# Patient Record
Sex: MALE | Race: WHITE | NOT HISPANIC OR LATINO | URBAN - METROPOLITAN AREA
[De-identification: names, ages, dates, MRNs, and addresses within clinical notes are randomized per-mention and may not be internally consistent; named-entity substitution may affect disease eponyms.]

---

## 2017-04-18 ENCOUNTER — IMPORTED ENCOUNTER (OUTPATIENT)
Dept: URBAN - METROPOLITAN AREA CLINIC 50 | Facility: CLINIC | Age: 72
End: 2017-04-18

## 2017-07-27 ENCOUNTER — IMPORTED ENCOUNTER (OUTPATIENT)
Dept: URBAN - METROPOLITAN AREA CLINIC 50 | Facility: CLINIC | Age: 72
End: 2017-07-27

## 2017-10-06 ENCOUNTER — IMPORTED ENCOUNTER (OUTPATIENT)
Dept: URBAN - METROPOLITAN AREA CLINIC 50 | Facility: CLINIC | Age: 72
End: 2017-10-06

## 2018-02-02 ENCOUNTER — IMPORTED ENCOUNTER (OUTPATIENT)
Dept: URBAN - METROPOLITAN AREA CLINIC 50 | Facility: CLINIC | Age: 73
End: 2018-02-02

## 2018-03-28 ENCOUNTER — APPOINTMENT (RX ONLY)
Dept: URBAN - METROPOLITAN AREA CLINIC 56 | Facility: CLINIC | Age: 73
Setting detail: DERMATOLOGY
End: 2018-03-28

## 2018-03-28 DIAGNOSIS — I78.8 OTHER DISEASES OF CAPILLARIES: ICD-10-CM

## 2018-03-28 DIAGNOSIS — L73.8 OTHER SPECIFIED FOLLICULAR DISORDERS: ICD-10-CM

## 2018-03-28 DIAGNOSIS — L82.0 INFLAMED SEBORRHEIC KERATOSIS: ICD-10-CM

## 2018-03-28 DIAGNOSIS — D69.2 OTHER NONTHROMBOCYTOPENIC PURPURA: ICD-10-CM

## 2018-03-28 DIAGNOSIS — L72.0 EPIDERMAL CYST: ICD-10-CM

## 2018-03-28 PROBLEM — R23.3 SPONTANEOUS ECCHYMOSES: Status: ACTIVE | Noted: 2018-03-28

## 2018-03-28 PROBLEM — H91.90 UNSPECIFIED HEARING LOSS, UNSPECIFIED EAR: Status: ACTIVE | Noted: 2018-03-28

## 2018-03-28 PROBLEM — I48.91 UNSPECIFIED ATRIAL FIBRILLATION: Status: ACTIVE | Noted: 2018-03-28

## 2018-03-28 PROBLEM — M12.9 ARTHROPATHY, UNSPECIFIED: Status: ACTIVE | Noted: 2018-03-28

## 2018-03-28 PROBLEM — J30.1 ALLERGIC RHINITIS DUE TO POLLEN: Status: ACTIVE | Noted: 2018-03-28

## 2018-03-28 PROBLEM — L85.3 XEROSIS CUTIS: Status: ACTIVE | Noted: 2018-03-28

## 2018-03-28 PROBLEM — E78.5 HYPERLIPIDEMIA, UNSPECIFIED: Status: ACTIVE | Noted: 2018-03-28

## 2018-03-28 PROCEDURE — ? LIQUID NITROGEN

## 2018-03-28 PROCEDURE — 99213 OFFICE O/P EST LOW 20 MIN: CPT | Mod: 25

## 2018-03-28 PROCEDURE — 17110 DESTRUCTION B9 LES UP TO 14: CPT

## 2018-03-28 PROCEDURE — ? COUNSELING

## 2018-03-28 ASSESSMENT — LOCATION DETAILED DESCRIPTION DERM
LOCATION DETAILED: LEFT LATERAL SUPERIOR CHEST
LOCATION DETAILED: NASAL DORSUM
LOCATION DETAILED: RIGHT MEDIAL MALAR CHEEK
LOCATION DETAILED: LEFT DISTAL RADIAL DORSAL FOREARM
LOCATION DETAILED: LEFT CENTRAL MALAR CHEEK
LOCATION DETAILED: RIGHT PROXIMAL DORSAL FOREARM
LOCATION DETAILED: LEFT MEDIAL MALAR CHEEK

## 2018-03-28 ASSESSMENT — LOCATION SIMPLE DESCRIPTION DERM
LOCATION SIMPLE: NOSE
LOCATION SIMPLE: LEFT CHEEK
LOCATION SIMPLE: RIGHT FOREARM
LOCATION SIMPLE: LEFT FOREARM
LOCATION SIMPLE: CHEST
LOCATION SIMPLE: RIGHT CHEEK

## 2018-03-28 ASSESSMENT — LOCATION ZONE DERM
LOCATION ZONE: NOSE
LOCATION ZONE: ARM
LOCATION ZONE: TRUNK
LOCATION ZONE: FACE

## 2018-03-28 NOTE — PROCEDURE: LIQUID NITROGEN
Detail Level: Detailed
Post-Care Instructions: I reviewed with the patient in detail post-care instructions. Patient is to wear sunprotection, and avoid picking at any of the treated lesions. Pt may apply Vaseline to crusted or scabbing areas.
Medical Necessity Clause: This procedure was medically necessary because the lesions that were treated were:
Include Z78.9 (Other Specified Conditions Influencing Health Status) As An Associated Diagnosis?: Yes
Render Post-Care Instructions In Note?: no
Medical Necessity Information: It is in your best interest to select a reason for this procedure from the list below. All of these items fulfill various CMS LCD requirements except the new and changing color options.
Consent: The patient's consent was obtained including but not limited to risks of crusting, scabbing, blistering, scarring, darker or lighter pigmentary change, recurrence, incomplete removal and infection.

## 2018-04-05 ENCOUNTER — IMPORTED ENCOUNTER (OUTPATIENT)
Dept: URBAN - METROPOLITAN AREA CLINIC 50 | Facility: CLINIC | Age: 73
End: 2018-04-05

## 2018-04-06 ENCOUNTER — IMPORTED ENCOUNTER (OUTPATIENT)
Dept: URBAN - METROPOLITAN AREA CLINIC 50 | Facility: CLINIC | Age: 73
End: 2018-04-06

## 2018-04-06 NOTE — PATIENT DISCUSSION
"""Phaco with IOL OD: 04/09/2018 Tecpa ZCB00 +17.50 Target: [facility]

## 2018-04-09 ENCOUNTER — IMPORTED ENCOUNTER (OUTPATIENT)
Dept: URBAN - METROPOLITAN AREA CLINIC 50 | Facility: CLINIC | Age: 73
End: 2018-04-09

## 2018-04-19 ENCOUNTER — IMPORTED ENCOUNTER (OUTPATIENT)
Dept: URBAN - METROPOLITAN AREA CLINIC 50 | Facility: CLINIC | Age: 73
End: 2018-04-19

## 2018-04-23 ENCOUNTER — IMPORTED ENCOUNTER (OUTPATIENT)
Dept: URBAN - METROPOLITAN AREA CLINIC 50 | Facility: CLINIC | Age: 73
End: 2018-04-23

## 2018-05-03 ENCOUNTER — IMPORTED ENCOUNTER (OUTPATIENT)
Dept: URBAN - METROPOLITAN AREA CLINIC 50 | Facility: CLINIC | Age: 73
End: 2018-05-03

## 2018-05-07 ENCOUNTER — IMPORTED ENCOUNTER (OUTPATIENT)
Dept: URBAN - METROPOLITAN AREA CLINIC 50 | Facility: CLINIC | Age: 73
End: 2018-05-07

## 2018-05-08 ENCOUNTER — IMPORTED ENCOUNTER (OUTPATIENT)
Dept: URBAN - METROPOLITAN AREA CLINIC 50 | Facility: CLINIC | Age: 73
End: 2018-05-08

## 2018-05-24 ENCOUNTER — IMPORTED ENCOUNTER (OUTPATIENT)
Dept: URBAN - METROPOLITAN AREA CLINIC 50 | Facility: CLINIC | Age: 73
End: 2018-05-24

## 2018-05-24 NOTE — PATIENT DISCUSSION
"""S/P IOL OU: OD: Tecnis ZCB00 +17.50 (Target: [facility])Femto/Arcs +TM. OS: Tecnis ZCB00 19.0 (Target: [facility])/Arcs +TM. "

## 2018-08-08 ENCOUNTER — IMPORTED ENCOUNTER (OUTPATIENT)
Dept: URBAN - METROPOLITAN AREA CLINIC 50 | Facility: CLINIC | Age: 73
End: 2018-08-08

## 2018-08-16 ENCOUNTER — IMPORTED ENCOUNTER (OUTPATIENT)
Dept: URBAN - METROPOLITAN AREA CLINIC 50 | Facility: CLINIC | Age: 73
End: 2018-08-16

## 2018-09-21 ENCOUNTER — IMPORTED ENCOUNTER (OUTPATIENT)
Dept: URBAN - METROPOLITAN AREA CLINIC 50 | Facility: CLINIC | Age: 73
End: 2018-09-21

## 2018-10-11 ENCOUNTER — IMPORTED ENCOUNTER (OUTPATIENT)
Dept: URBAN - METROPOLITAN AREA CLINIC 50 | Facility: CLINIC | Age: 73
End: 2018-10-11

## 2019-04-23 ENCOUNTER — IMPORTED ENCOUNTER (OUTPATIENT)
Dept: URBAN - METROPOLITAN AREA CLINIC 50 | Facility: CLINIC | Age: 74
End: 2019-04-23

## 2019-04-24 ENCOUNTER — IMPORTED ENCOUNTER (OUTPATIENT)
Dept: URBAN - METROPOLITAN AREA CLINIC 50 | Facility: CLINIC | Age: 74
End: 2019-04-24

## 2019-05-09 ENCOUNTER — IMPORTED ENCOUNTER (OUTPATIENT)
Dept: URBAN - METROPOLITAN AREA CLINIC 50 | Facility: CLINIC | Age: 74
End: 2019-05-09

## 2019-05-09 NOTE — PATIENT DISCUSSION
"""Continue Baby shampoo both eyes twice a day ."" ""Continue Erythromycin ointment both eyes at bedtime . "" ""Continue Warm compresses both eyes twice a day . """

## 2019-07-23 ENCOUNTER — IMPORTED ENCOUNTER (OUTPATIENT)
Dept: URBAN - METROPOLITAN AREA CLINIC 50 | Facility: CLINIC | Age: 74
End: 2019-07-23

## 2019-08-08 ENCOUNTER — IMPORTED ENCOUNTER (OUTPATIENT)
Dept: URBAN - METROPOLITAN AREA CLINIC 50 | Facility: CLINIC | Age: 74
End: 2019-08-08

## 2020-01-02 ENCOUNTER — IMPORTED ENCOUNTER (OUTPATIENT)
Dept: URBAN - METROPOLITAN AREA CLINIC 50 | Facility: CLINIC | Age: 75
End: 2020-01-02

## 2020-01-02 NOTE — PATIENT DISCUSSION
"""Continue Artificial tears both eyes two - four times a day ."" ""Continue Baby shampoo both eyes twice a day ."" ""Continue Erythromycin ointment both eyes at bedtime . "" ""Continue Warm compresses both eyes twice a day . """

## 2020-06-30 ENCOUNTER — IMPORTED ENCOUNTER (OUTPATIENT)
Dept: URBAN - METROPOLITAN AREA CLINIC 50 | Facility: CLINIC | Age: 75
End: 2020-06-30

## 2021-04-18 ASSESSMENT — VISUAL ACUITY
OS_CC: 20/20
OS_CC: 20/100-
OS_BAT: 20/25
OD_BAT: 20/30
OS_SC: 20/25
OS_BAT: 20/25
OS_SC: 20/25+2
OD_SC: 20/20
OS_OTHER: 20/25. 20/70.
OS_CC: 20/20-2
OD_SC: 20/20
OS_CC: J1+
OS_CC: J1+
OS_SC: 20/25
OS_CC: 20/20
OS_CC: 20/25-
OD_CC: J1+
OD_CC: 20/25-
OD_SC: 20/20-1
OD_CC: 20/25-1
OD_CC: 20/20
OD_CC: 20/20
OS_CC: 20/25+2
OS_SC: 20/25
OD_CC: J1+
OS_SC: 20/20
OS_SC: 20/20
OD_OTHER: 20/40. 20/60.
OS_OTHER: 20/40. >20/400.
OD_SC: 20/20
OD_BAT: 20/25
OD_SC: 20/20
OD_CC: 20/20-1
OD_OTHER: 20/25. 20/60.
OS_SC: 20/20-
OD_CC: J1+
OD_CC: J1+@ 16 IN
OS_CC: 20/20-2
OS_CC: J1+
OD_SC: 20/200+
OD_CC: J1+
OS_CC: J1+
OD_CC: J1+
OD_CC: J1
OD_SC: 20/20
OS_CC: J1+
OD_SC: 20/25+2
OD_OTHER: 20/25. 20/60.
OS_OTHER: 20/25. 20/25.
OS_SC: 20/25
OD_CC: J1+
OD_SC: 20/20
OS_OTHER: 20/25. 20/25.
OS_SC: 20/20
OS_CC: J1+@ 16 IN
OS_BAT: 20/25
OS_BAT: 20/40
OD_SC: 20/20
OD_OTHER: 20/30. 20/70.
OD_CC: 20/25Â±
OD_SC: 20/20
OD_CC: J7
OS_CC: J1
OS_CC: J1+
OS_CC: J7
OD_BAT: 20/40
OD_CC: 20/30-
OS_CC: 20/20-
OD_BAT: 20/25

## 2021-04-18 ASSESSMENT — TONOMETRY
OS_IOP_MMHG: 18
OS_IOP_MMHG: 20
OS_IOP_MMHG: 16
OD_IOP_MMHG: 18
OD_IOP_MMHG: 14
OD_IOP_MMHG: 18
OD_IOP_MMHG: 19
OS_IOP_MMHG: 14
OD_IOP_MMHG: 16
OS_IOP_MMHG: 16
OD_IOP_MMHG: 15
OD_IOP_MMHG: 16
OS_IOP_MMHG: 10
OS_IOP_MMHG: 16
OS_IOP_MMHG: 17
OD_IOP_MMHG: 32
OS_IOP_MMHG: 15
OS_IOP_MMHG: 19
OD_IOP_MMHG: 17
OD_IOP_MMHG: 14
OD_IOP_MMHG: 18
OS_IOP_MMHG: 15
OS_IOP_MMHG: 19
OS_IOP_MMHG: 17
OD_IOP_MMHG: 17
OS_IOP_MMHG: 14
OD_IOP_MMHG: 17
OD_IOP_MMHG: 16
OD_IOP_MMHG: 16
OS_IOP_MMHG: 17
OD_IOP_MMHG: 21
OS_IOP_MMHG: 16
OS_IOP_MMHG: 17
OS_IOP_MMHG: 15
OD_IOP_MMHG: 16

## 2021-09-29 ENCOUNTER — ANNUAL COMPREHENSIVE EXAM (OUTPATIENT)
Dept: URBAN - METROPOLITAN AREA CLINIC 49 | Facility: CLINIC | Age: 76
End: 2021-09-29

## 2021-09-29 DIAGNOSIS — H35.3131: ICD-10-CM

## 2021-09-29 DIAGNOSIS — H26.491: ICD-10-CM

## 2021-09-29 PROCEDURE — 92134 CPTRZ OPH DX IMG PST SGM RTA: CPT

## 2021-09-29 PROCEDURE — 92014 COMPRE OPH EXAM EST PT 1/>: CPT

## 2021-09-29 PROCEDURE — 92015 DETERMINE REFRACTIVE STATE: CPT

## 2021-09-29 ASSESSMENT — TONOMETRY
OD_IOP_MMHG: 18
OS_IOP_MMHG: 18

## 2021-09-29 ASSESSMENT — VISUAL ACUITY
OS_CC: 20/20
OD_CC: 20/20
OU_CC: J1

## 2022-03-29 ENCOUNTER — ESTABLISHED PATIENT (OUTPATIENT)
Dept: URBAN - METROPOLITAN AREA CLINIC 53 | Facility: CLINIC | Age: 77
End: 2022-03-29

## 2022-03-29 DIAGNOSIS — H35.3111: ICD-10-CM

## 2022-03-29 DIAGNOSIS — H35.3122: ICD-10-CM

## 2022-03-29 DIAGNOSIS — H43.813: ICD-10-CM

## 2022-03-29 PROCEDURE — 92134 CPTRZ OPH DX IMG PST SGM RTA: CPT

## 2022-03-29 PROCEDURE — 92014 COMPRE OPH EXAM EST PT 1/>: CPT

## 2022-03-29 ASSESSMENT — TONOMETRY
OS_IOP_MMHG: 17
OD_IOP_MMHG: 17

## 2022-03-29 ASSESSMENT — VISUAL ACUITY
OD_CC: 20/25
OD_CC: J1+-2
OS_CC: 20/25-2
OU_CC: 20/20-2 SLOW
OD_GLARE: 20/60
OU_CC: J1+-1
OD_GLARE: 20/50

## 2022-03-29 NOTE — PATIENT DISCUSSION
Patient under the care of [facility]/Dr. Corrie Mulligan. Advised to call Dr. Awa Sol office and schedule an upcoming appointment.

## 2022-07-11 ENCOUNTER — ESTABLISHED PATIENT (OUTPATIENT)
Dept: URBAN - METROPOLITAN AREA CLINIC 52 | Facility: CLINIC | Age: 77
End: 2022-07-11

## 2022-07-11 DIAGNOSIS — H11.823: ICD-10-CM

## 2022-07-11 PROCEDURE — 92012 INTRM OPH EXAM EST PATIENT: CPT

## 2022-07-11 RX ORDER — OLOPATADINE HYDROCHLORIDE 2 MG/ML: 1 SOLUTION OPHTHALMIC EVERY MORNING

## 2022-07-11 ASSESSMENT — TONOMETRY
OS_IOP_MMHG: 17
OD_IOP_MMHG: 17

## 2022-07-11 ASSESSMENT — VISUAL ACUITY
OD_CC: 20/25+2
OS_CC: 20/25-2
OU_CC: 20/25+1

## 2022-07-11 NOTE — PATIENT DISCUSSION
Patient is currently using Zaditor Qd. Advised to increase to BID, at nnon time and evening time. Start Pataday OU Qam. (green label, extra strength).

## 2023-01-31 ENCOUNTER — ESTABLISHED PATIENT (OUTPATIENT)
Dept: URBAN - METROPOLITAN AREA CLINIC 53 | Facility: CLINIC | Age: 78
End: 2023-01-31

## 2023-01-31 DIAGNOSIS — H10.13: ICD-10-CM

## 2023-01-31 PROCEDURE — 92012 INTRM OPH EXAM EST PATIENT: CPT

## 2023-01-31 RX ORDER — FLUOROMETHOLONE 1 MG/ML: 1 SOLUTION/ DROPS OPHTHALMIC TWICE A DAY

## 2023-01-31 ASSESSMENT — TONOMETRY
OS_IOP_MMHG: 15
OD_IOP_MMHG: 16

## 2023-01-31 ASSESSMENT — VISUAL ACUITY
OS_CC: 20/25
OD_CC: 20/25-2

## 2023-01-31 NOTE — PATIENT DISCUSSION
Continue PF tears 2-3 times a day OU. Start cool compresses BID OU. Advised patient to use pataday OU.

## 2023-04-05 ENCOUNTER — COMPREHENSIVE EXAM (OUTPATIENT)
Dept: URBAN - METROPOLITAN AREA CLINIC 49 | Facility: CLINIC | Age: 78
End: 2023-04-05

## 2023-04-05 DIAGNOSIS — H26.491: ICD-10-CM

## 2023-04-05 DIAGNOSIS — H43.813: ICD-10-CM

## 2023-04-05 DIAGNOSIS — H02.834: ICD-10-CM

## 2023-04-05 DIAGNOSIS — H02.831: ICD-10-CM

## 2023-04-05 DIAGNOSIS — H35.3122: ICD-10-CM

## 2023-04-05 DIAGNOSIS — H35.3111: ICD-10-CM

## 2023-04-05 DIAGNOSIS — H04.123: ICD-10-CM

## 2023-04-05 PROCEDURE — 92014 COMPRE OPH EXAM EST PT 1/>: CPT

## 2023-04-05 PROCEDURE — 92134 CPTRZ OPH DX IMG PST SGM RTA: CPT

## 2023-04-05 PROCEDURE — 92015 DETERMINE REFRACTIVE STATE: CPT

## 2023-04-05 ASSESSMENT — VISUAL ACUITY
OD_CC: J1
OS_CC: 20/25
OD_GLARE: 20/40
OD_CC: 20/25
OU_CC: 20/20
OS_CC: J1
OD_GLARE: 20/25
OU_CC: J1+

## 2023-04-05 ASSESSMENT — TONOMETRY
OS_IOP_MMHG: 14
OD_IOP_MMHG: 15

## 2023-08-01 ENCOUNTER — CLINIC PROCEDURE ONLY (OUTPATIENT)
Dept: URBAN - METROPOLITAN AREA CLINIC 53 | Facility: CLINIC | Age: 78
End: 2023-08-01

## 2023-08-01 DIAGNOSIS — H26.491: ICD-10-CM

## 2023-08-01 PROCEDURE — 66821 AFTER CATARACT LASER SURGERY: CPT

## 2023-08-01 ASSESSMENT — VISUAL ACUITY
OD_GLARE: 20/50
OS_CC: 20/25
OS_CC: J1
OD_CC: 20/25
OD_GLARE: 20/40
OD_CC: J1

## 2023-08-01 ASSESSMENT — TONOMETRY
OD_IOP_MMHG: 15
OS_IOP_MMHG: 16

## 2023-09-26 ENCOUNTER — FOLLOW UP (OUTPATIENT)
Dept: URBAN - METROPOLITAN AREA CLINIC 53 | Facility: CLINIC | Age: 78
End: 2023-09-26

## 2023-09-26 DIAGNOSIS — Z98.890: ICD-10-CM

## 2023-09-26 PROCEDURE — 92015 DETERMINE REFRACTIVE STATE: CPT

## 2023-09-26 PROCEDURE — 99024 POSTOP FOLLOW-UP VISIT: CPT

## 2023-09-26 ASSESSMENT — TONOMETRY
OD_IOP_MMHG: 15
OS_IOP_MMHG: 16

## 2023-09-26 ASSESSMENT — KERATOMETRY
OD_K1POWER_DIOPTERS: 42.00
OS_K1POWER_DIOPTERS: 42.25
OD_AXISANGLE_DEGREES: 166
OS_AXISANGLE_DEGREES: 2
OS_K2POWER_DIOPTERS: 43.00
OD_K2POWER_DIOPTERS: 42.50
OD_AXISANGLE2_DEGREES: 76
OS_AXISANGLE2_DEGREES: 92

## 2023-09-26 ASSESSMENT — VISUAL ACUITY
OD_CC: 20/25+2
OS_CC: 20/25-1

## 2024-04-30 ENCOUNTER — COMPREHENSIVE EXAM (OUTPATIENT)
Dept: URBAN - METROPOLITAN AREA CLINIC 53 | Facility: CLINIC | Age: 79
End: 2024-04-30

## 2024-04-30 DIAGNOSIS — H02.831: ICD-10-CM

## 2024-04-30 DIAGNOSIS — H52.4: ICD-10-CM

## 2024-04-30 DIAGNOSIS — H35.3111: ICD-10-CM

## 2024-04-30 DIAGNOSIS — H02.834: ICD-10-CM

## 2024-04-30 DIAGNOSIS — H04.123: ICD-10-CM

## 2024-04-30 DIAGNOSIS — H43.813: ICD-10-CM

## 2024-04-30 DIAGNOSIS — H35.3122: ICD-10-CM

## 2024-04-30 PROCEDURE — 99214 OFFICE O/P EST MOD 30 MIN: CPT

## 2024-04-30 PROCEDURE — 92134 CPTRZ OPH DX IMG PST SGM RTA: CPT

## 2024-04-30 PROCEDURE — 92015 DETERMINE REFRACTIVE STATE: CPT

## 2024-04-30 ASSESSMENT — VISUAL ACUITY
OS_CC: 20/25
OS_GLARE: 20/30
OU_CC: J1+@14"
OS_GLARE: 20/30
OU_CC: 20/20
OS_CC: J3@14"
OD_CC: J2@14"
OD_CC: 20/20

## 2024-04-30 ASSESSMENT — KERATOMETRY
OS_K2POWER_DIOPTERS: 42.75
OD_K2POWER_DIOPTERS: 42.50
OD_AXISANGLE_DEGREES: 146
OS_K1POWER_DIOPTERS: 41.75
OS_AXISANGLE2_DEGREES: 101
OS_AXISANGLE_DEGREES: 11
OD_K1POWER_DIOPTERS: 42.00
OD_AXISANGLE2_DEGREES: 56

## 2024-04-30 ASSESSMENT — TONOMETRY
OD_IOP_MMHG: 15
OS_IOP_MMHG: 16

## 2025-01-21 ENCOUNTER — EMERGENCY VISIT (OUTPATIENT)
Age: 80
End: 2025-01-21

## 2025-01-21 DIAGNOSIS — H11.32: ICD-10-CM

## 2025-01-21 DIAGNOSIS — H02.834: ICD-10-CM

## 2025-01-21 DIAGNOSIS — H04.123: ICD-10-CM

## 2025-01-21 DIAGNOSIS — H02.831: ICD-10-CM

## 2025-01-21 PROCEDURE — 99213 OFFICE O/P EST LOW 20 MIN: CPT

## 2025-05-06 ENCOUNTER — COMPREHENSIVE EXAM (OUTPATIENT)
Age: 80
End: 2025-05-06

## 2025-05-06 DIAGNOSIS — H02.834: ICD-10-CM

## 2025-05-06 DIAGNOSIS — H04.123: ICD-10-CM

## 2025-05-06 DIAGNOSIS — H43.813: ICD-10-CM

## 2025-05-06 DIAGNOSIS — H52.4: ICD-10-CM

## 2025-05-06 DIAGNOSIS — H35.3111: ICD-10-CM

## 2025-05-06 DIAGNOSIS — H35.3122: ICD-10-CM

## 2025-05-06 DIAGNOSIS — H02.831: ICD-10-CM

## 2025-05-06 PROCEDURE — 92015 DETERMINE REFRACTIVE STATE: CPT

## 2025-05-06 PROCEDURE — 92134 CPTRZ OPH DX IMG PST SGM RTA: CPT

## 2025-05-06 PROCEDURE — 92014 COMPRE OPH EXAM EST PT 1/>: CPT
